# Patient Record
Sex: FEMALE | Race: WHITE | NOT HISPANIC OR LATINO | Employment: FULL TIME | URBAN - METROPOLITAN AREA
[De-identification: names, ages, dates, MRNs, and addresses within clinical notes are randomized per-mention and may not be internally consistent; named-entity substitution may affect disease eponyms.]

---

## 2023-07-28 ENCOUNTER — TELEPHONE (OUTPATIENT)
Dept: PRIMARY CARE | Facility: CLINIC | Age: 63
End: 2023-07-28
Payer: COMMERCIAL

## 2023-07-28 DIAGNOSIS — I10 HYPERTENSION, UNSPECIFIED TYPE: ICD-10-CM

## 2023-07-28 RX ORDER — ATENOLOL 100 MG/1
100 TABLET ORAL DAILY
Qty: 30 TABLET | Refills: 0 | Status: SHIPPED | OUTPATIENT
Start: 2023-07-28 | End: 2023-08-24

## 2023-07-28 RX ORDER — ATENOLOL 100 MG/1
1 TABLET ORAL DAILY
COMMUNITY
Start: 2013-08-09 | End: 2023-07-28 | Stop reason: SDUPTHER

## 2023-08-24 DIAGNOSIS — I10 HYPERTENSION, UNSPECIFIED TYPE: ICD-10-CM

## 2023-08-24 RX ORDER — ATENOLOL 100 MG/1
100 TABLET ORAL DAILY
Qty: 30 TABLET | Refills: 0 | Status: SHIPPED | OUTPATIENT
Start: 2023-08-24 | End: 2023-08-25 | Stop reason: SDUPTHER

## 2023-08-25 ENCOUNTER — OFFICE VISIT (OUTPATIENT)
Dept: PRIMARY CARE | Facility: CLINIC | Age: 63
End: 2023-08-25
Payer: COMMERCIAL

## 2023-08-25 VITALS
HEART RATE: 65 BPM | DIASTOLIC BLOOD PRESSURE: 93 MMHG | WEIGHT: 148 LBS | HEIGHT: 63 IN | SYSTOLIC BLOOD PRESSURE: 147 MMHG | BODY MASS INDEX: 26.22 KG/M2 | TEMPERATURE: 97.6 F | OXYGEN SATURATION: 98 %

## 2023-08-25 DIAGNOSIS — I10 BENIGN ESSENTIAL HYPERTENSION: Primary | ICD-10-CM

## 2023-08-25 DIAGNOSIS — I10 HYPERTENSION, UNSPECIFIED TYPE: ICD-10-CM

## 2023-08-25 DIAGNOSIS — Z13.820 SCREENING FOR OSTEOPOROSIS: ICD-10-CM

## 2023-08-25 DIAGNOSIS — E78.2 HYPERLIPEMIA, MIXED: ICD-10-CM

## 2023-08-25 DIAGNOSIS — Z12.31 ENCOUNTER FOR SCREENING MAMMOGRAM FOR MALIGNANT NEOPLASM OF BREAST: ICD-10-CM

## 2023-08-25 DIAGNOSIS — Z12.11 COLON CANCER SCREENING: ICD-10-CM

## 2023-08-25 PROBLEM — G43.109 MIGRAINE WITH AURA AND WITHOUT STATUS MIGRAINOSUS, NOT INTRACTABLE: Status: ACTIVE | Noted: 2023-08-25

## 2023-08-25 LAB
NON-UH HIE A/G RATIO: 1.2
NON-UH HIE ALB: 4.1 G/DL (ref 3.4–5)
NON-UH HIE ALK PHOS: 64 UNIT/L (ref 46–116)
NON-UH HIE APPEARANCE, U: CLEAR
NON-UH HIE BASO COUNT: 0.05 X1000 (ref 0–0.2)
NON-UH HIE BASOS %: 0.6 %
NON-UH HIE BILIRUBIN, TOTAL: 0.4 MG/DL (ref 0.2–1)
NON-UH HIE BILIRUBIN, U: NEGATIVE
NON-UH HIE BLOOD, U: NEGATIVE
NON-UH HIE BUN/CREAT RATIO: 20
NON-UH HIE BUN: 16 MG/DL (ref 9–23)
NON-UH HIE CALCIUM: 9.4 MG/DL (ref 8.7–10.4)
NON-UH HIE CALCULATED LDL CHOLESTEROL: 181 MG/DL (ref 60–130)
NON-UH HIE CALCULATED OSMOLALITY: 277 MOSM/KG (ref 275–295)
NON-UH HIE CHLORIDE: 105 MMOL/L (ref 98–107)
NON-UH HIE CHOLESTEROL: 268 MG/DL (ref 100–200)
NON-UH HIE CO2, VENOUS: 28 MMOL/L (ref 20–31)
NON-UH HIE COLOR, U: YELLOW
NON-UH HIE CREATININE, URINE MG/DL: 207.9 MG/DL
NON-UH HIE CREATININE: 0.8 MG/DL (ref 0.5–0.8)
NON-UH HIE DIFF?: NO
NON-UH HIE EOS COUNT: 0.27 X1000 (ref 0–0.5)
NON-UH HIE EOSIN %: 3.8 %
NON-UH HIE GFR AA: >60
NON-UH HIE GLOBULIN: 3.3 G/DL
NON-UH HIE GLOMERULAR FILTRATION RATE: >60 ML/MIN/1.73M?
NON-UH HIE GLUCOSE QUAL, U: NEGATIVE
NON-UH HIE GLUCOSE: 94 MG/DL (ref 74–106)
NON-UH HIE GOT: 19 UNIT/L (ref 15–37)
NON-UH HIE GPT: 16 UNIT/L (ref 10–49)
NON-UH HIE HCT: 40.6 % (ref 36–46)
NON-UH HIE HDL CHOLESTEROL: 57 MG/DL (ref 40–60)
NON-UH HIE HGB A1C: 5.3 %
NON-UH HIE HGB: 14.3 G/DL (ref 12–16)
NON-UH HIE INSTR WBC: 7.2
NON-UH HIE K: 4 MMOL/L (ref 3.5–5.1)
NON-UH HIE KETONES, U: NEGATIVE
NON-UH HIE LEUKOCYTE ESTERASE, U: NEGATIVE
NON-UH HIE LYMPH %: 24.1 %
NON-UH HIE LYMPH COUNT: 1.73 X1000 (ref 1.2–4.8)
NON-UH HIE MAGNESIUM: 2.1 MG/DL (ref 1.6–2.6)
NON-UH HIE MCH: 29.3 PG (ref 27–34)
NON-UH HIE MCHC: 35.2 G/DL (ref 32–37)
NON-UH HIE MCV: 83.1 FL (ref 80–100)
NON-UH HIE MICROALBUMIN, URINE MG/L: 33 MG/L
NON-UH HIE MICROALBUMIN/CREATININE RATIO: 16 MG MALB/GM CREAT (ref 0–30)
NON-UH HIE MONO %: 6 %
NON-UH HIE MONO COUNT: 0.43 X1000 (ref 0.1–1)
NON-UH HIE MPV: 6.9 FL (ref 7.4–10.4)
NON-UH HIE NA: 138 MMOL/L (ref 135–145)
NON-UH HIE NEUTROPHIL %: 65.4 %
NON-UH HIE NEUTROPHIL COUNT (ANC): 4.68 X1000 (ref 1.4–8.8)
NON-UH HIE NITRITE, U: NEGATIVE
NON-UH HIE NON-SQUAMOUS EPITHELIAL, U: 1 #/HPF
NON-UH HIE NUCLEATED RBC: 0 /100WBC
NON-UH HIE PH, U: 6.5 (ref 4.5–8)
NON-UH HIE PLATELET: 238 X10 (ref 150–450)
NON-UH HIE PROTEIN, U: ABNORMAL
NON-UH HIE RBC: 4.88 X10 (ref 4.2–5.4)
NON-UH HIE RDW: 13.3 % (ref 11.5–14.5)
NON-UH HIE SPECIFIC GRAVITY, U: 1.02 (ref 1–1.03)
NON-UH HIE SQUAMOUS EPITHELIAL CELLS, U: 2 #/HPF
NON-UH HIE TOTAL CHOL/HDL CHOL RATIO: 4.7
NON-UH HIE TOTAL PROTEIN: 7.4 G/DL (ref 5.7–8.2)
NON-UH HIE TRIGLYCERIDES: 148 MG/DL (ref 30–150)
NON-UH HIE TSH: 0.9 UIU/ML (ref 0.55–4.78)
NON-UH HIE U MICRO: ABNORMAL
NON-UH HIE URIC ACID: 7.2 MG/DL (ref 3.1–7.8)
NON-UH HIE UROBILINOGEN QUAL, U: ABNORMAL
NON-UH HIE WBC/HPF, U: 1 #/HPF (ref 0–5)
NON-UH HIE WBC: 7.2 X10 (ref 4.5–11)

## 2023-08-25 PROCEDURE — 1159F MED LIST DOCD IN RCRD: CPT | Performed by: INTERNAL MEDICINE

## 2023-08-25 PROCEDURE — 3077F SYST BP >= 140 MM HG: CPT | Performed by: INTERNAL MEDICINE

## 2023-08-25 PROCEDURE — 1036F TOBACCO NON-USER: CPT | Performed by: INTERNAL MEDICINE

## 2023-08-25 PROCEDURE — 3080F DIAST BP >= 90 MM HG: CPT | Performed by: INTERNAL MEDICINE

## 2023-08-25 PROCEDURE — 1160F RVW MEDS BY RX/DR IN RCRD: CPT | Performed by: INTERNAL MEDICINE

## 2023-08-25 PROCEDURE — 99214 OFFICE O/P EST MOD 30 MIN: CPT | Performed by: INTERNAL MEDICINE

## 2023-08-25 RX ORDER — ATENOLOL 100 MG/1
100 TABLET ORAL DAILY
Qty: 30 TABLET | Refills: 11 | Status: SHIPPED | OUTPATIENT
Start: 2023-08-25

## 2023-08-25 RX ORDER — RAMIPRIL 5 MG/1
5 CAPSULE ORAL ONCE
Status: DISCONTINUED | OUTPATIENT
Start: 2023-08-25 | End: 2023-08-25

## 2023-08-25 RX ORDER — UBROGEPANT 100 MG/1
100 TABLET ORAL ONCE AS NEEDED
COMMUNITY
End: 2024-05-13

## 2023-08-25 RX ORDER — RAMIPRIL 5 MG/1
5 CAPSULE ORAL DAILY
Qty: 90 CAPSULE | Refills: 1 | Status: SHIPPED | OUTPATIENT
Start: 2023-08-25

## 2023-08-25 RX ORDER — ASPIRIN 81 MG/1
81 TABLET ORAL ONCE
COMMUNITY
Start: 2017-11-02

## 2023-08-25 RX ORDER — BUTALBITAL, ACETAMINOPHEN AND CAFFEINE 50; 325; 40 MG/1; MG/1; MG/1
TABLET ORAL
COMMUNITY
Start: 2012-08-27 | End: 2023-11-27

## 2023-08-25 NOTE — PROGRESS NOTES
Subjective   Patient ID: Debora Moreira is a 63 y.o. female who presents for Follow-up (Medication refills).    Assessment/Plan     Problem List Items Addressed This Visit       Benign essential hypertension - Primary     Patients BP readings reviewed and addressed, as we age our arteries turn stiffer and less elastic. Restricting salt consumption and staying physically fit with regular exercise regimen is the only way to keep our vasculature less tonic. Studies have shown that keeping ideal body wt, exercise routine about 140 to 150 minutes a week, eating variety of plant based diet and drinking plentiful water are quite helpful. Monitor BP twice or once a week at home and bring log to be reviewed by me. Uncontrolled BP has long term consequences including heart failure, myocardial infarction, accelerated atherosclerosis and kidney dysfunction. Therapy reviewed and explained.           Relevant Medications    atenolol (Tenormin) 100 mg tablet    Colon cancer screening    Relevant Orders    Albumin , Urine Random    CBC and Auto Differential    Comprehensive Metabolic Panel    Hemoglobin A1C    Lipid Panel    Magnesium    Urine Culture    Urinalysis Microscopic Only    Uric Acid    TSH with reflex to Free T4 if abnormal    Cologuard® colon cancer screening    Hyperlipemia, mixed   Patient was evaluated today, problem list was reviewed, problems and concerns addressed, Rx list reviewed and updated, lab and tests were noted and reviewed. Life style changes were discussed, always it works better if we eat plant based diet and plenty of fibres and roughage. Consume adequate amount of water and avoid alcohol, light to moderate physical activities and stress reduction are always beneficial for ongoing physical well being. Do not forget to have 6 to 7 hours of sleep regularly and avoid late night livan screen exposure.      HPI  This is a 62-year-old patient of migraine hypertension hyperlipidemia complaining arthralgia myalgia  fatigue tired weakness    Patient is 63-year-old     No brother 3 sister    Mother father both passed from pneumonia complication    Negative for breast cancer colon cancer    Advised colon cancer osteoporosis mammogram cancer screening flu pneumonia COVID-19 vaccines sent for blood test follow-up 4 months  Past Medical History:   Diagnosis Date    Cellulitis of left upper limb 10/13/2022    Cellulitis of hand, left    Chronic migraine without aura, intractable, without status migrainosus 07/23/2019    Migraine, chronic, without aura, intractable    Dorsalgia, unspecified 12/09/2014    Dorsodynia    Encounter for screening for malignant neoplasm of colon 10/10/2022    Screen for colon cancer    Encounter for screening for malignant neoplasm of rectum 10/10/2022    Screening for rectal cancer    Influenza due to unidentified influenza virus with other respiratory manifestations 02/24/2020    Bronchitis with flu    Other conditions influencing health status 02/28/2020    History of cough    Personal history of other diseases of the circulatory system 10/10/2022    History of hypertension    Personal history of other diseases of the musculoskeletal system and connective tissue 11/02/2017    History of neck pain    Personal history of other diseases of the musculoskeletal system and connective tissue 07/22/2022    History of gout    Personal history of other diseases of the musculoskeletal system and connective tissue 10/03/2016    History of fibromyalgia    Personal history of other diseases of the nervous system and sense organs 07/23/2019    History of neuropathy    Personal history of other diseases of the respiratory system 02/28/2020    History of upper respiratory infection    Personal history of other diseases of the respiratory system 02/24/2020    History of influenza    Personal history of other endocrine, nutritional and metabolic disease 10/10/2022    History of vitamin D deficiency    Personal  history of other mental and behavioral disorders 02/28/2020    History of anxiety    Personal history of urinary (tract) infections 05/31/2016    History of urinary tract infection    Radiculopathy, cervical region 11/02/2017    Cervical radiculopathy     Past Surgical History:   Procedure Laterality Date    HAND SURGERY       Allergies   Allergen Reactions    Paxlovid [Nirmatrelvir-Ritonavir] Unknown    Tetanus Antitoxin Other     felt very hot and arm swelled up     Current Outpatient Medications   Medication Sig Dispense Refill    aspirin 81 mg EC tablet Take 1 tablet (81 mg) by mouth 1 time.      butalbital-acetaminophen-caff -40 mg tablet Take by mouth.      Ubrelvy 100 mg tablet tablet Take 1 tablet (100 mg) by mouth 1 time if needed.      atenolol (Tenormin) 100 mg tablet Take 1 tablet (100 mg) by mouth once daily. 30 tablet 11     No current facility-administered medications for this visit.     Family History   Problem Relation Name Age of Onset    No Known Problems Mother      No Known Problems Father       Social History     Socioeconomic History    Marital status:      Spouse name: None    Number of children: None    Years of education: None    Highest education level: None   Occupational History    None   Tobacco Use    Smoking status: Former     Types: Cigarettes    Smokeless tobacco: Never   Substance and Sexual Activity    Alcohol use: Yes     Comment: occasional    Drug use: Never    Sexual activity: None   Other Topics Concern    None   Social History Narrative    None     Social Determinants of Health     Financial Resource Strain: Not on file   Food Insecurity: Not on file   Transportation Needs: Not on file   Physical Activity: Not on file   Stress: Not on file   Social Connections: Not on file   Intimate Partner Violence: Not on file   Housing Stability: Not on file     Immunization History   Administered Date(s) Administered    Hepatitis B vaccine, pediatric/adolescent (RECOMBIVAX,  "ENGERIX) 12/28/2018    Influenza, seasonal, injectable 09/08/2010, 11/14/2011, 08/27/2012, 11/12/2013, 11/02/2017    Influenza, seasonal, injectable, preservative free 11/14/2015, 02/03/2017    Pfizer Gray Cap SARS-CoV-2 04/08/2022    Pfizer Purple Cap SARS-CoV-2 03/22/2021, 04/12/2021    Pneumococcal conjugate vaccine, 13-valent (PREVNAR 13) 10/03/2016    Pneumococcal polysaccharide vaccine, 23-valent, age 2 years and older (PNEUMOVAX 23) 06/26/2020       Review of Systems  Review of systems is otherwise negative unless stated above or in history of present illness.    Objective   Visit Vitals  BP (!) 147/93 (BP Location: Left arm, Patient Position: Sitting, BP Cuff Size: Adult)   Pulse 65   Temp 36.4 °C (97.6 °F)   Ht 1.6 m (5' 3\")   Wt 67.1 kg (148 lb)   SpO2 98%   BMI 26.22 kg/m²   Smoking Status Former   BSA 1.73 m²     Physical Exam  Constitutional:       General: not in acute distress.  Anxiety   HENT:      Head: Normocephalic and atraumatic.      Nose: Nose normal.   Eyes:      Extraocular Movements: Extraocular movements intact.      Conjunctiva/sclera: Conjunctivae normal.   Cardiovascular: Heart murmur     Rate and Rhythm: Normal rate ,  No M/R/G  Pulmonary:      Effort: Pulmonary effort is normal.      Breath sounds: Normal, Bilat Equal AE  Skin:     General: Skin is warm.   Neurological: Migraine     Mental Status: He is alert and oriented to person, place, and time.   Psychiatric:         Mood and Affect: Mood normal.         Behavior: Behavior normal.   Musculoskeletal   FROM in all extremitirs,  Joint-no swelling or tenderness    No visits with results within 4 Month(s) from this visit.   Latest known visit with results is:   [facility] Encounter on 06/26/2020   Component Date Value Ref Range Status    Cholesterol 06/26/2020 242 (H)  0 - 199 mg/dL Final    HDL 06/26/2020 52.8  mg/dL Final    Cholesterol/HDL Ratio 06/26/2020 4.6   Final    LDL 06/26/2020 139 (H)  0 - 99 mg/dL Final    VLDL 06/26/2020 50 " (H)  0 - 40 mg/dL Final    Triglycerides 06/26/2020 251 (H)  0 - 149 mg/dL Final    Non HDL Cholesterol 06/26/2020 189  mg/dL Final    WBC, Urine 06/26/2020 2  0 - 5 /HPF Final    RBC, Urine 06/26/2020 1  0 - 5 /HPF Final    Squamous Epithelial Cells, Urine 06/26/2020 5  /HPF Final    Mucus, Urine 06/26/2020 1+  /LPF Final    Hyaline Casts, Urine 06/26/2020 OCC (A)  /LPF Final    Glucose 06/26/2020 88  74 - 99 mg/dL Final    Sodium 06/26/2020 139  136 - 145 mmol/L Final    Potassium 06/26/2020 4.0  3.5 - 5.3 mmol/L Final    Chloride 06/26/2020 105  98 - 107 mmol/L Final    Bicarbonate 06/26/2020 27  21 - 32 mmol/L Final    Anion Gap 06/26/2020 11  10 - 20 mmol/L Final    Urea Nitrogen 06/26/2020 10  6 - 23 mg/dL Final    Creatinine 06/26/2020 0.63  0.50 - 1.05 mg/dL Final    GLOMERULAR FILTRATION RATE-NON AFR* 06/26/2020 >60  >60 mL/min/1.73m2 Final    GLOMERULAR FILTRATION RATE-* 06/26/2020 >60  >60 mL/min/1.73m2 Final    Calcium 06/26/2020 9.6  8.6 - 10.6 mg/dL Final    Albumin 06/26/2020 4.2  3.4 - 5.0 g/dL Final    Alkaline Phosphatase 06/26/2020 64  33 - 136 U/L Final    Total Protein 06/26/2020 7.2  6.4 - 8.2 g/dL Final    AST 06/26/2020 15  9 - 39 U/L Final    Total Bilirubin 06/26/2020 0.3  0.0 - 1.2 mg/dL Final    ALT (SGPT) 06/26/2020 13  7 - 45 U/L Final    WBC 06/26/2020 5.5  4.4 - 11.3 x10E9/L Final    nRBC 06/26/2020 0.0  0.0 - 0.0 /100 WBC Final    RBC 06/26/2020 4.91  4.00 - 5.20 x10E12/L Final    Hemoglobin 06/26/2020 14.0  12.0 - 16.0 g/dL Final    Hematocrit 06/26/2020 42.0  36.0 - 46.0 % Final    MCV 06/26/2020 86  80 - 100 fL Final    MCHC 06/26/2020 33.3  32.0 - 36.0 g/dL Final    Platelets 06/26/2020 237  150 - 450 x10E9/L Final    RDW 06/26/2020 12.9  11.5 - 14.5 % Final    Neutrophils % 06/26/2020 51.1  40.0 - 80.0 % Final    Immature Granulocytes %, Automated 06/26/2020 0.2  0.0 - 0.9 % Final    Lymphocytes % 06/26/2020 36.5  13.0 - 44.0 % Final    Monocytes % 06/26/2020 7.9  2.0 -  10.0 % Final    Eosinophils % 06/26/2020 3.8  0.0 - 6.0 % Final    Basophils % 06/26/2020 0.5  0.0 - 2.0 % Final    Neutrophils Absolute 06/26/2020 2.83  1.20 - 7.70 x10E9/L Final    Lymphocytes Absolute 06/26/2020 2.02  1.20 - 4.80 x10E9/L Final    Monocytes Absolute 06/26/2020 0.44  0.10 - 1.00 x10E9/L Final    Eosinophils Absolute 06/26/2020 0.21  0.00 - 0.70 x10E9/L Final    Basophils Absolute 06/26/2020 0.03  0.00 - 0.10 x10E9/L Final    Color, Urine 06/26/2020 YELLOW  STRAW,YELLOW Final    Appearance, Urine 06/26/2020 HAZY  CLEAR Final    Specific Gravity, Urine 06/26/2020 1.018  1.005 - 1.035 Final    pH, Urine 06/26/2020 6.0  5.0 - 8.0 Final    Protein, Urine 06/26/2020 NEGATIVE  NEGATIVE mg/dL Final    Glucose, Urine 06/26/2020 NEGATIVE  NEGATIVE mg/dL Final    Blood, Urine 06/26/2020 NEGATIVE  NEGATIVE Final    Ketones, Urine 06/26/2020 NEGATIVE  NEGATIVE mg/dL Final    Bilirubin, Urine 06/26/2020 NEGATIVE  NEGATIVE Final    Urobilinogen, Urine 06/26/2020 <2.0  0.0 - 1.9 mg/dL Final    Nitrite, Urine 06/26/2020 NEGATIVE  NEGATIVE Final    Leukocyte Esterase, Urine 06/26/2020 TRACE (A)  NEGATIVE Final       Radiology: Reviewed imaging in powerchart.  No results found.      Charting was completed using voice recognition technology and may include unintended errors.

## 2023-08-28 ENCOUNTER — TELEPHONE (OUTPATIENT)
Dept: PRIMARY CARE | Facility: CLINIC | Age: 63
End: 2023-08-28
Payer: COMMERCIAL

## 2023-08-28 DIAGNOSIS — E78.2 HYPERLIPEMIA, MIXED: Primary | ICD-10-CM

## 2023-08-28 NOTE — TELEPHONE ENCOUNTER
Low-protein diet low fat diet   Crestor 2.5 mg a day #90 follow-up 3 months to check your cholesterol and blood pressure both-- PER DR MAJOR

## 2023-08-29 RX ORDER — ROSUVASTATIN CALCIUM 5 MG/1
2.5 TABLET, COATED ORAL DAILY
Qty: 45 TABLET | Refills: 1 | Status: SHIPPED | OUTPATIENT
Start: 2023-08-29 | End: 2024-02-16

## 2023-11-22 ENCOUNTER — PROCEDURE VISIT (OUTPATIENT)
Dept: NEUROLOGY | Facility: CLINIC | Age: 63
End: 2023-11-22
Payer: COMMERCIAL

## 2023-11-22 DIAGNOSIS — G43.719 INTRACTABLE CHRONIC MIGRAINE WITHOUT AURA AND WITHOUT STATUS MIGRAINOSUS: Primary | ICD-10-CM

## 2023-11-22 PROCEDURE — 64615 CHEMODENERV MUSC MIGRAINE: CPT | Performed by: PSYCHIATRY & NEUROLOGY
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
## 2023-11-27 DIAGNOSIS — G43.109 MIGRAINE WITH AURA AND WITHOUT STATUS MIGRAINOSUS, NOT INTRACTABLE: Primary | ICD-10-CM

## 2023-11-27 RX ORDER — BUTALBITAL, ACETAMINOPHEN AND CAFFEINE 50; 325; 40 MG/1; MG/1; MG/1
TABLET ORAL
Qty: 40 TABLET | Refills: 5 | Status: SHIPPED | OUTPATIENT
Start: 2023-11-27 | End: 2024-05-13

## 2024-02-14 DIAGNOSIS — E78.2 HYPERLIPEMIA, MIXED: ICD-10-CM

## 2024-02-16 RX ORDER — ROSUVASTATIN CALCIUM 5 MG/1
2.5 TABLET, COATED ORAL DAILY
Qty: 45 TABLET | Refills: 1 | Status: SHIPPED | OUTPATIENT
Start: 2024-02-16

## 2024-02-21 ENCOUNTER — PROCEDURE VISIT (OUTPATIENT)
Dept: NEUROLOGY | Facility: CLINIC | Age: 64
End: 2024-02-21
Payer: COMMERCIAL

## 2024-02-21 DIAGNOSIS — G43.719 INTRACTABLE CHRONIC MIGRAINE WITHOUT AURA AND WITHOUT STATUS MIGRAINOSUS: Primary | ICD-10-CM

## 2024-02-21 PROCEDURE — 64615 CHEMODENERV MUSC MIGRAINE: CPT | Performed by: PSYCHIATRY & NEUROLOGY
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
## 2024-05-11 DIAGNOSIS — G43.109 MIGRAINE WITH AURA AND WITHOUT STATUS MIGRAINOSUS, NOT INTRACTABLE: ICD-10-CM

## 2024-05-13 DIAGNOSIS — G43.109 MIGRAINE WITH AURA AND WITHOUT STATUS MIGRAINOSUS, NOT INTRACTABLE: Primary | ICD-10-CM

## 2024-05-13 RX ORDER — UBROGEPANT 100 MG/1
TABLET ORAL
Qty: 10 TABLET | Status: SHIPPED | OUTPATIENT
Start: 2024-05-13

## 2024-05-13 RX ORDER — BUTALBITAL, ACETAMINOPHEN AND CAFFEINE 50; 325; 40 MG/1; MG/1; MG/1
TABLET ORAL
Qty: 15 TABLET | Refills: 5 | Status: SHIPPED | OUTPATIENT
Start: 2024-05-13

## 2024-05-22 ENCOUNTER — PROCEDURE VISIT (OUTPATIENT)
Dept: NEUROLOGY | Facility: CLINIC | Age: 64
End: 2024-05-22
Payer: COMMERCIAL

## 2024-05-22 DIAGNOSIS — G43.719 INTRACTABLE CHRONIC MIGRAINE WITHOUT AURA AND WITHOUT STATUS MIGRAINOSUS: Primary | ICD-10-CM

## 2024-05-22 PROCEDURE — 64615 CHEMODENERV MUSC MIGRAINE: CPT | Performed by: PSYCHIATRY & NEUROLOGY
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
## 2024-08-21 ENCOUNTER — APPOINTMENT (OUTPATIENT)
Dept: NEUROLOGY | Facility: CLINIC | Age: 64
End: 2024-08-21
Payer: COMMERCIAL

## 2024-08-21 DIAGNOSIS — G43.719 INTRACTABLE CHRONIC MIGRAINE WITHOUT AURA AND WITHOUT STATUS MIGRAINOSUS: Primary | ICD-10-CM

## 2024-08-21 PROCEDURE — 64615 CHEMODENERV MUSC MIGRAINE: CPT | Performed by: PSYCHIATRY & NEUROLOGY
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
## 2024-09-12 DIAGNOSIS — I10 HYPERTENSION, UNSPECIFIED TYPE: ICD-10-CM

## 2024-09-12 DIAGNOSIS — E78.2 HYPERLIPEMIA, MIXED: ICD-10-CM

## 2024-09-12 RX ORDER — ROSUVASTATIN CALCIUM 5 MG/1
2.5 TABLET, COATED ORAL DAILY
Qty: 15 TABLET | Refills: 0 | Status: SHIPPED | OUTPATIENT
Start: 2024-09-12

## 2024-09-12 RX ORDER — ATENOLOL 100 MG/1
100 TABLET ORAL DAILY
Qty: 30 TABLET | Refills: 0 | Status: SHIPPED | OUTPATIENT
Start: 2024-09-12

## 2024-09-19 DIAGNOSIS — G43.109 MIGRAINE WITH AURA AND WITHOUT STATUS MIGRAINOSUS, NOT INTRACTABLE: ICD-10-CM

## 2024-09-19 RX ORDER — BUTALBITAL, ACETAMINOPHEN AND CAFFEINE 50; 325; 40 MG/1; MG/1; MG/1
TABLET ORAL
Qty: 15 TABLET | Refills: 5 | Status: SHIPPED | OUTPATIENT
Start: 2024-09-19

## 2024-09-20 ENCOUNTER — APPOINTMENT (OUTPATIENT)
Dept: PRIMARY CARE | Facility: CLINIC | Age: 64
End: 2024-09-20
Payer: COMMERCIAL

## 2024-09-20 VITALS
OXYGEN SATURATION: 95 % | HEART RATE: 63 BPM | BODY MASS INDEX: 27.82 KG/M2 | DIASTOLIC BLOOD PRESSURE: 82 MMHG | SYSTOLIC BLOOD PRESSURE: 160 MMHG | HEIGHT: 63 IN | WEIGHT: 157 LBS

## 2024-09-20 DIAGNOSIS — I10 BENIGN ESSENTIAL HYPERTENSION: Primary | ICD-10-CM

## 2024-09-20 DIAGNOSIS — I10 HYPERTENSION, UNSPECIFIED TYPE: ICD-10-CM

## 2024-09-20 DIAGNOSIS — E78.2 HYPERLIPEMIA, MIXED: ICD-10-CM

## 2024-09-20 DIAGNOSIS — Z12.31 VISIT FOR SCREENING MAMMOGRAM: ICD-10-CM

## 2024-09-20 PROCEDURE — 1036F TOBACCO NON-USER: CPT | Performed by: EMERGENCY MEDICINE

## 2024-09-20 PROCEDURE — 3008F BODY MASS INDEX DOCD: CPT | Performed by: EMERGENCY MEDICINE

## 2024-09-20 PROCEDURE — 3079F DIAST BP 80-89 MM HG: CPT | Performed by: EMERGENCY MEDICINE

## 2024-09-20 PROCEDURE — 1159F MED LIST DOCD IN RCRD: CPT | Performed by: EMERGENCY MEDICINE

## 2024-09-20 PROCEDURE — 99213 OFFICE O/P EST LOW 20 MIN: CPT | Performed by: EMERGENCY MEDICINE

## 2024-09-20 PROCEDURE — 3077F SYST BP >= 140 MM HG: CPT | Performed by: EMERGENCY MEDICINE

## 2024-09-20 RX ORDER — ATENOLOL 100 MG/1
100 TABLET ORAL DAILY
Qty: 30 TABLET | Refills: 5 | Status: SHIPPED | OUTPATIENT
Start: 2024-09-20

## 2024-09-20 ASSESSMENT — PATIENT HEALTH QUESTIONNAIRE - PHQ9
SUM OF ALL RESPONSES TO PHQ9 QUESTIONS 1 AND 2: 0
2. FEELING DOWN, DEPRESSED OR HOPELESS: NOT AT ALL
1. LITTLE INTEREST OR PLEASURE IN DOING THINGS: NOT AT ALL

## 2024-09-20 NOTE — PROGRESS NOTES
Subjective   Patient ID: Debora Moreira is a 64 y.o. female who presents for Med Refill.    Assessment/Plan     Problem List Items Addressed This Visit    None  Visit Diagnoses       Visit for screening mammogram    -  Primary    Hypertension, unspecified type            Patient presents for follow up visit    Hypertension - Patient is on atenolol, requested a refill before her vacation. Atenolol refilled.    Patient was evaluated today, problem list was reviewed, problems and concerns addressed, Rx list reviewed and updated, lab and tests were noted and reviewed. Life style changes were discussed, always it works better if we eat plant based diet and plenty of fibres and roughage. Consume adequate amount of water and avoid alcohol, light to moderate physical activities and stress reduction are always beneficial for ongoing physical well being. Do not forget to have 6 to 7 hours of sleep regularly and avoid late night livan screen exposure.    HPI    Patient presents for follow up visit    Patient is 64-year-old     No brother 3 sister    Mother father both passed from pneumonia complication    Negative for breast cancer colon cancer    Advised colon cancer osteoporosis mammogram cancer screening flu pneumonia COVID-19 vaccines sent for blood test follow-up 4 months    Past Medical History:   Diagnosis Date    Cellulitis of left upper limb 10/13/2022    Cellulitis of hand, left    Chronic migraine without aura, intractable, without status migrainosus 07/23/2019    Migraine, chronic, without aura, intractable    Dorsalgia, unspecified 12/09/2014    Dorsodynia    Encounter for screening for malignant neoplasm of colon 10/10/2022    Screen for colon cancer    Encounter for screening for malignant neoplasm of rectum 10/10/2022    Screening for rectal cancer    Influenza due to unidentified influenza virus with other respiratory manifestations 02/24/2020    Bronchitis with flu    Other conditions influencing health  status 02/28/2020    History of cough    Personal history of other diseases of the circulatory system 10/10/2022    History of hypertension    Personal history of other diseases of the musculoskeletal system and connective tissue 11/02/2017    History of neck pain    Personal history of other diseases of the musculoskeletal system and connective tissue 07/22/2022    History of gout    Personal history of other diseases of the musculoskeletal system and connective tissue 10/03/2016    History of fibromyalgia    Personal history of other diseases of the nervous system and sense organs 07/23/2019    History of neuropathy    Personal history of other diseases of the respiratory system 02/28/2020    History of upper respiratory infection    Personal history of other diseases of the respiratory system 02/24/2020    History of influenza    Personal history of other endocrine, nutritional and metabolic disease 10/10/2022    History of vitamin D deficiency    Personal history of other mental and behavioral disorders 02/28/2020    History of anxiety    Personal history of urinary (tract) infections 05/31/2016    History of urinary tract infection    Radiculopathy, cervical region 11/02/2017    Cervical radiculopathy     Past Surgical History:   Procedure Laterality Date    HAND SURGERY       Allergies   Allergen Reactions    Paxlovid [Nirmatrelvir-Ritonavir] Unknown    Tetanus Antitoxin Other     felt very hot and arm swelled up     Current Outpatient Medications   Medication Sig Dispense Refill    aspirin 81 mg EC tablet Take 1 tablet (81 mg) by mouth 1 time.      atenolol (Tenormin) 100 mg tablet Take 1 tablet (100 mg) by mouth once daily. 30 tablet 0    butalbital-acetaminophen-caff -40 mg tablet TAKE 1 TABLET BY MOUTH TWICE DAILY AS NEEDED FOR HEADACHE 15 tablet 5    Ubrelvy 100 mg tablet tablet TAKE 1 TABLET AS NEEDED FOR MIGRAINE maximum 2 (TWO) tablets per day 10 tablet PRN    ramipril (Altace) 5 mg capsule Take  1 capsule (5 mg) by mouth once daily. (Patient not taking: Reported on 9/20/2024) 90 capsule 1    rosuvastatin (Crestor) 5 mg tablet Take 0.5 tablets (2.5 mg) by mouth once daily. (Patient not taking: Reported on 9/20/2024) 15 tablet 0     No current facility-administered medications for this visit.     Family History   Problem Relation Name Age of Onset    No Known Problems Mother      No Known Problems Father       Social History     Socioeconomic History    Marital status:    Tobacco Use    Smoking status: Former     Types: Cigarettes    Smokeless tobacco: Never   Substance and Sexual Activity    Alcohol use: Yes     Comment: occasional    Drug use: Never     Immunization History   Administered Date(s) Administered    Flu vaccine, trivalent, preservative free, age 6 months and greater (Fluarix/Fluzone/Flulaval) 11/14/2015, 02/03/2017    Hepatitis B vaccine, 19 yrs and under (RECOMBIVAX, ENGERIX) 12/28/2018    Influenza, seasonal, injectable 09/08/2010, 11/14/2011, 08/27/2012, 11/12/2013, 11/02/2017    Pfizer Gray Cap SARS-CoV-2 04/08/2022    Pfizer Purple Cap SARS-CoV-2 03/22/2021, 04/12/2021    Pneumococcal conjugate vaccine, 13-valent (PREVNAR 13) 10/03/2016    Pneumococcal polysaccharide vaccine, 23-valent, age 2 years and older (PNEUMOVAX 23) 06/26/2020       Review of Systems  Review of systems is otherwise negative unless stated above or in history of present illness.    Objective     Physical Exam  Constitutional:       General: not in acute distress.  Anxiety   HENT:      Head: Normocephalic and atraumatic.      Nose: Nose normal.   Eyes:      Extraocular Movements: Extraocular movements intact.      Conjunctiva/sclera: Conjunctivae normal.   Cardiovascular: Heart murmur     Rate and Rhythm: Normal rate ,  No M/R/G  Pulmonary:      Effort: Pulmonary effort is normal.      Breath sounds: Normal, Bilat Equal AE  Skin:     General: Skin is warm.   Neurological: Migraine     Mental Status: He is alert  and oriented to person, place, and time.   Psychiatric:         Mood and Affect: Mood normal.         Behavior: Behavior normal.   Musculoskeletal   FROM in all extremitirs,  Joint-no swelling or tenderness    No visits with results within 4 Month(s) from this visit.   Latest known visit with results is:   Orders Only on 08/25/2023   Component Date Value Ref Range Status    [facility] MPV 08/25/2023 6.9 (L)  7.4 - 10.4 fL Final    [facility] HGB 08/25/2023 14.3  12.0 - 16.0 g/dL Final    [facility] WBC 08/25/2023 7.2  4.5 - 11.0 x10 Final    [facility] RDW 08/25/2023 13.3  11.5 - 14.5 % Final    [facility] MCH 08/25/2023 29.3  27.0 - 34.0 pg Final    [facility] Nucleated RBC 08/25/2023 0  /100WBC Final    [facility] HCT 08/25/2023 40.6  36.0 - 46.0 % Final    [facility] Platelet 08/25/2023 238  150 - 450 x10 Final    [facility] RBC 08/25/2023 4.88  4.20 - 5.40 x10 Final    [facility] Instr WBC 08/25/2023 7.2   Final    [facility] MCHC 08/25/2023 35.2  32.0 - 37.0 g/dL Final    [facility] MCV 08/25/2023 83.1  80.0 - 100.0 fL Final    [facility] DIFF? 08/25/2023 No   Final    [facility] Basos % 08/25/2023 0.6  % Final    [facility] Mono % 08/25/2023 6.0  % Final    [facility] Baso Count 08/25/2023 0.05  0.00 - 0.20 x1000 Final    [facility] Mono Count 08/25/2023 0.43  0.10 - 1.00 x1000 Final    [facility] Neutrophil Count (ANC) 08/25/2023 4.68  1.40 - 8.80 x1000 Final    [facility] Neutrophil % 08/25/2023 65.4  % Final    [facility] Eosin % 08/25/2023 3.8  % Final    [facility] Lymph % 08/25/2023 24.1  % Final    [facility] Eos Count 08/25/2023 0.27  0.00 - 0.50 x1000 Final    [facility] Lymph Count 08/25/2023 1.73  1.20 - 4.80 x1000 Final    [facility] HGB A1C 08/25/2023 5.3  % Final    [facility] TSH 08/25/2023 0.90  0.55 - 4.78 uIU/ml Final    [facility] Magnesium 08/25/2023 2.1  1.6 - 2.6 mg/dL Final    [facility] Uric Acid 08/25/2023 7.2  3.1 - 7.8 mg/dL Final    [facility] ALB 08/25/2023 4.1  3.4 - 5.0 g/dL  Final    [facility] GFR AA 08/25/2023 >60   Final    [facility] Bilirubin, Total 08/25/2023 0.40  0.20 - 1.00 mg/dL Final    [facility] Chloride 08/25/2023 105  98 - 107 mmol/L Final    [facility] A/G Ratio 08/25/2023 1.2   Final    [facility] Na 08/25/2023 138  135 - 145 mmol/L Final    [facility] BUN/Creat Ratio 08/25/2023 20.0   Final    [facility] GPT 08/25/2023 16  10 - 49 unit/L Final    [facility] Alk Phos 08/25/2023 64  46 - 116 unit/L Final    [facility] Creatinine 08/25/2023 0.8  0.5 - 0.8 mg/dL Final    [facility] Total Protein 08/25/2023 7.4  5.7 - 8.2 g/dL Final    [facility] CO2, venous 08/25/2023 28.0  20.0 - 31.0 mmol/L Final    [facility] Glomerular Filtration R* 08/25/2023 >60  mL/min/1.73m? Final    [facility] Calculated Osmolality 08/25/2023 277  275 - 295 mOsm/kg Final    [facility] K 08/25/2023 4.0  3.5 - 5.1 mmol/L Final    [facility] Globulin 08/25/2023 3.3  g/dL Final    [facility] BUN 08/25/2023 16  9 - 23 mg/dL Final    [facility] Calcium 08/25/2023 9.4  8.7 - 10.4 mg/dL Final    [facility] GOT 08/25/2023 19  15 - 37 unit/L Final    [facility] Glucose 08/25/2023 94  74 - 106 mg/dL Final    [facility] Calculated LDL Choleste* 08/25/2023 181 (H)  60 - 130 mg/dL Final    [facility] HDL Cholesterol 08/25/2023 57  40 - 60 mg/dL Final    [facility] Total Chol/HDL Chol Rat* 08/25/2023 4.7   Final    [facility] Triglycerides 08/25/2023 148  30 - 150 mg/dL Final    [facility] Cholesterol 08/25/2023 268 (H)  100 - 200 mg/dL Final    [facility] Microalbumin, Urine mg/L 08/25/2023 33.0  mg/L Final    [facility] Microalbumin/Creatinine* 08/25/2023 16  0 - 30 mg MALB/gm CREAT Final    [facility] Creatinine, Urine mg/dl 08/25/2023 207.9  mg/dL Final    [facility] Leukocyte Esterase, U 08/25/2023 Negative  Negative Final    [facility] Bilirubin, U 08/25/2023 Negative  Negative Final    [facility] Urobilinogen Qual, U 08/25/2023 0.2 EU/dl  0.1-1.0 mg/dl Final    [facility] Appearance, U 08/25/2023 Clear    Final    [facility] pH, U 08/25/2023 6.5  4.5 - 8.0 Final    [facility] Squamous Epithelial Otilia* 08/25/2023 2  #/HPF Final    [facility] Specific Gravity, U 08/25/2023 1.025  1.001 - 1.035 Final    [facility] U MICRO 08/25/2023 Indicated   Final    [facility] Nitrite, U 08/25/2023 Negative  Negative Final    [facility] Ketones, U 08/25/2023 Negative  Negative Final    [facility] Non-Squamous Epithelial* 08/25/2023 1  #/HPF Final    [facility] Glucose Qual, U 08/25/2023 Negative  Negative Final    [facility] Protein, U 08/25/2023 Trace (A)  Negative Final    [facility] Color, U 08/25/2023 Yellow   Final    [facility] Blood, U 08/25/2023 Negative  Negative Final    [facility] WBC/HPF, U 08/25/2023 1  0 - 5 #/HPF Final       Radiology: Reviewed imaging in powerchart.  No results found.      Charting was completed using voice recognition technology and may include unintended errors.

## 2024-11-20 ENCOUNTER — APPOINTMENT (OUTPATIENT)
Dept: NEUROLOGY | Facility: CLINIC | Age: 64
End: 2024-11-20
Payer: COMMERCIAL

## 2024-11-20 DIAGNOSIS — G43.719 INTRACTABLE CHRONIC MIGRAINE WITHOUT AURA AND WITHOUT STATUS MIGRAINOSUS: Primary | ICD-10-CM

## 2024-11-20 PROCEDURE — 64615 CHEMODENERV MUSC MIGRAINE: CPT | Performed by: PSYCHIATRY & NEUROLOGY
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
## 2024-12-18 ENCOUNTER — TELEPHONE (OUTPATIENT)
Dept: NEUROLOGY | Facility: CLINIC | Age: 64
End: 2024-12-18
Payer: MEDICARE

## 2024-12-18 DIAGNOSIS — R11.0 NAUSEA: ICD-10-CM

## 2024-12-18 DIAGNOSIS — G43.109 MIGRAINE WITH AURA AND WITHOUT STATUS MIGRAINOSUS, NOT INTRACTABLE: Primary | ICD-10-CM

## 2024-12-18 RX ORDER — ONDANSETRON 4 MG/1
TABLET, FILM COATED ORAL
Qty: 30 TABLET | Refills: 11 | Status: SHIPPED | OUTPATIENT
Start: 2024-12-18

## 2024-12-18 NOTE — TELEPHONE ENCOUNTER
At last visit you mentioned she that something could be prescribed for her nausea when she gets a headache, are you able to send something to the pharmacy

## 2025-02-19 ENCOUNTER — APPOINTMENT (OUTPATIENT)
Dept: NEUROLOGY | Facility: CLINIC | Age: 65
End: 2025-02-19
Payer: MEDICARE

## 2025-02-19 DIAGNOSIS — G43.719 INTRACTABLE CHRONIC MIGRAINE WITHOUT AURA AND WITHOUT STATUS MIGRAINOSUS: Primary | ICD-10-CM

## 2025-02-19 PROCEDURE — 64615 CHEMODENERV MUSC MIGRAINE: CPT | Performed by: PSYCHIATRY & NEUROLOGY
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
## 2025-03-12 DIAGNOSIS — I10 HYPERTENSION, UNSPECIFIED TYPE: ICD-10-CM

## 2025-03-13 RX ORDER — ATENOLOL 100 MG/1
100 TABLET ORAL DAILY
Qty: 30 TABLET | Refills: 5 | OUTPATIENT
Start: 2025-03-13

## 2025-03-19 DIAGNOSIS — G43.109 MIGRAINE WITH AURA AND WITHOUT STATUS MIGRAINOSUS, NOT INTRACTABLE: ICD-10-CM

## 2025-03-19 RX ORDER — BUTALBITAL, ACETAMINOPHEN AND CAFFEINE 50; 325; 40 MG/1; MG/1; MG/1
TABLET ORAL
Qty: 15 TABLET | Refills: 5 | Status: SHIPPED | OUTPATIENT
Start: 2025-03-19

## 2025-04-11 DIAGNOSIS — I10 HYPERTENSION, UNSPECIFIED TYPE: ICD-10-CM

## 2025-04-14 RX ORDER — ATENOLOL 100 MG/1
100 TABLET ORAL DAILY
Qty: 30 TABLET | Refills: 5 | Status: SHIPPED | OUTPATIENT
Start: 2025-04-14

## 2025-05-13 ENCOUNTER — APPOINTMENT (OUTPATIENT)
Dept: PRIMARY CARE | Facility: CLINIC | Age: 65
End: 2025-05-13
Payer: MEDICARE

## 2025-05-14 DIAGNOSIS — G43.109 MIGRAINE WITH AURA AND WITHOUT STATUS MIGRAINOSUS, NOT INTRACTABLE: ICD-10-CM

## 2025-05-14 RX ORDER — UBROGEPANT 100 MG/1
TABLET ORAL
Qty: 10 TABLET | Status: SHIPPED | OUTPATIENT
Start: 2025-05-14

## 2025-05-20 ENCOUNTER — APPOINTMENT (OUTPATIENT)
Dept: NEUROLOGY | Facility: CLINIC | Age: 65
End: 2025-05-20
Payer: MEDICARE

## 2025-05-20 DIAGNOSIS — G43.719 INTRACTABLE CHRONIC MIGRAINE WITHOUT AURA AND WITHOUT STATUS MIGRAINOSUS: Primary | ICD-10-CM

## 2025-05-20 PROCEDURE — 64615 CHEMODENERV MUSC MIGRAINE: CPT | Performed by: PSYCHIATRY & NEUROLOGY
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
## 2025-08-20 ENCOUNTER — APPOINTMENT (OUTPATIENT)
Dept: NEUROLOGY | Facility: CLINIC | Age: 65
End: 2025-08-20
Payer: MEDICARE

## 2025-08-20 DIAGNOSIS — G43.719 INTRACTABLE CHRONIC MIGRAINE WITHOUT AURA AND WITHOUT STATUS MIGRAINOSUS: Primary | ICD-10-CM

## 2025-08-20 PROCEDURE — 64615 CHEMODENERV MUSC MIGRAINE: CPT | Performed by: PSYCHIATRY & NEUROLOGY

## 2025-11-19 ENCOUNTER — APPOINTMENT (OUTPATIENT)
Dept: NEUROLOGY | Facility: CLINIC | Age: 65
End: 2025-11-19
Payer: MEDICARE